# Patient Record
Sex: FEMALE | Race: OTHER | Employment: FULL TIME | URBAN - METROPOLITAN AREA
[De-identification: names, ages, dates, MRNs, and addresses within clinical notes are randomized per-mention and may not be internally consistent; named-entity substitution may affect disease eponyms.]

---

## 2019-09-23 ENCOUNTER — HOSPITAL ENCOUNTER (EMERGENCY)
Facility: HOSPITAL | Age: 39
Discharge: HOME | End: 2019-09-23
Attending: EMERGENCY MEDICINE

## 2019-09-23 VITALS
DIASTOLIC BLOOD PRESSURE: 68 MMHG | WEIGHT: 186.75 LBS | BODY MASS INDEX: 31.11 KG/M2 | RESPIRATION RATE: 18 BRPM | SYSTOLIC BLOOD PRESSURE: 116 MMHG | OXYGEN SATURATION: 100 % | HEIGHT: 65 IN | HEART RATE: 69 BPM | TEMPERATURE: 98 F

## 2019-09-23 DIAGNOSIS — R10.9 ABDOMINAL CRAMPING: Primary | ICD-10-CM

## 2019-09-23 LAB
B-HCG UR QL: NEGATIVE
BACTERIA GENITAL QL WET PREP: ABNORMAL
BILIRUB UR QL STRIP: NEGATIVE
CLARITY UR REFRACT.AUTO: CLEAR
CLUE CELLS VAG QL WET PREP: ABNORMAL
COLOR UR AUTO: NORMAL
CTP QC/QA: YES
FILAMENT FUNGI VAG WET PREP-#/AREA: ABNORMAL
GLUCOSE UR QL STRIP: NEGATIVE
HGB UR QL STRIP: NEGATIVE
KETONES UR QL STRIP: NEGATIVE
LEUKOCYTE ESTERASE UR QL STRIP: NEGATIVE
NITRITE UR QL STRIP: NEGATIVE
PH UR STRIP: 8 [PH] (ref 5–8)
PROT UR QL STRIP: NEGATIVE
SP GR UR STRIP: 1 (ref 1–1.03)
SPECIMEN SOURCE: ABNORMAL
T VAGINALIS GENITAL QL WET PREP: ABNORMAL
URN SPEC COLLECT METH UR: NORMAL
WBC #/AREA VAG WET PREP: ABNORMAL
YEAST GENITAL QL WET PREP: ABNORMAL

## 2019-09-23 PROCEDURE — 81003 URINALYSIS AUTO W/O SCOPE: CPT

## 2019-09-23 PROCEDURE — 87491 CHLMYD TRACH DNA AMP PROBE: CPT

## 2019-09-23 PROCEDURE — 81025 URINE PREGNANCY TEST: CPT | Performed by: PHYSICIAN ASSISTANT

## 2019-09-23 PROCEDURE — 87210 SMEAR WET MOUNT SALINE/INK: CPT

## 2019-09-23 PROCEDURE — 99284 EMERGENCY DEPT VISIT MOD MDM: CPT | Mod: 25

## 2019-09-23 PROCEDURE — 99282 PR EMERGENCY DEPT VISIT,LEVEL II: ICD-10-PCS | Mod: ,,, | Performed by: PHYSICIAN ASSISTANT

## 2019-09-23 PROCEDURE — 99282 EMERGENCY DEPT VISIT SF MDM: CPT | Mod: ,,, | Performed by: PHYSICIAN ASSISTANT

## 2019-09-24 LAB
C TRACH DNA SPEC QL NAA+PROBE: NOT DETECTED
N GONORRHOEA DNA SPEC QL NAA+PROBE: NOT DETECTED

## 2019-09-24 NOTE — ED TRIAGE NOTES
Patricia Talbot, an 38 y.o. female presents to the ED after taking several pregnancy tests at home, most of which were negative.  Patient says one of them was positive and she's passing small stringy pieces of blood, that her abdomen is growing and that she feels some cramping as if she were pregnant.  Patient's fellow church[employer]ers have told her that sometimes pregnancy tests are negative with urine but that they require a blood test.        Review of patient's allergies indicates:  Allergies not on file  Chief Complaint   Patient presents with    Abdominal Pain     positive preg test 1 month ago, now having pain to RUQ and RLQ of abdomen. also reports spotting from 9/16-9/19. denies N/V/D, but endorses fatigue     No past medical history on file.

## 2019-09-24 NOTE — ED PROVIDER NOTES
Encounter Date: 9/23/2019       History     Chief Complaint   Patient presents with    Abdominal Pain     positive preg test 1 month ago, now having pain to RUQ and RLQ of abdomen. also reports spotting from 9/16-9/19. denies N/V/D, but endorses fatigue     38-year-old female with no medical history presents to the ED complaining of lower abdominal pain that started this afternoon.  She describes the pain as intermittent 2-3/10 cramping.  She has not taken any over-the-counter pain medication prior to arrival.  She reports nausea without vomiting and mild headache. She is concerned that she might be pregnant.  LMP 09/16/2019.  She denies fever, chills, dysuria, hematuria, vaginal bleeding, vaginal discharge, flank pain, diarrhea.    The history is provided by the patient. The history is limited by a language barrier ([place]). A  was used.     Review of patient's allergies indicates:  No Known Allergies  No past medical history on file.  No past surgical history on file.  No family history on file.  Social History     Tobacco Use    Smoking status: Not on file   Substance Use Topics    Alcohol use: Not on file    Drug use: Not on file     Review of Systems   Constitutional: Negative for chills and fever.   HENT: Negative for congestion, rhinorrhea and sore throat.    Eyes: Negative for photophobia and visual disturbance.   Respiratory: Negative for cough and shortness of breath.    Cardiovascular: Negative for chest pain.   Gastrointestinal: Positive for abdominal pain and nausea. Negative for constipation, diarrhea and vomiting.   Genitourinary: Negative for dysuria, hematuria, vaginal bleeding and vaginal discharge.   Musculoskeletal: Negative for back pain, neck pain and neck stiffness.   Skin: Negative for rash and wound.   Neurological: Positive for headaches. Negative for light-headedness and numbness.   Psychiatric/Behavioral: Negative for confusion.       Physical Exam     Initial  Vitals [09/23/19 2023]   BP Pulse Resp Temp SpO2   116/68 69 18 98 °F (36.7 °C) 100 %      MAP       --         Physical Exam    Nursing note and vitals reviewed.  Constitutional: She appears well-developed and well-nourished. She is not diaphoretic. No distress.   HENT:   Head: Normocephalic and atraumatic.   Neck: Normal range of motion. Neck supple.   Cardiovascular: Normal rate, regular rhythm and normal heart sounds. Exam reveals no gallop and no friction rub.    No murmur heard.  Pulmonary/Chest: Breath sounds normal. She has no wheezes. She has no rhonchi. She has no rales.   Abdominal: Soft. Bowel sounds are normal. There is no tenderness. There is no rebound and no guarding.   Genitourinary: There is no rash or tenderness on the right labia. There is no rash or tenderness on the left labia. Uterus is not tender. Cervix exhibits no motion tenderness, no discharge and no friability. Right adnexum displays no mass and no tenderness. Left adnexum displays no mass and no tenderness. No tenderness or bleeding in the vagina. No vaginal discharge found.   Genitourinary Comments: Irregular appearance of the inferior cervix - cobblestoning.   Musculoskeletal: Normal range of motion.   Neurological: She is alert and oriented to person, place, and time.   Skin: Skin is warm and dry. No rash noted. No erythema.   Psychiatric: She has a normal mood and affect.         ED Course   Procedures  Labs Reviewed   VAGINAL SCREEN - Abnormal; Notable for the following components:       Result Value    Clue Cells Rare (*)     WBC - Vaginal Screen Rare (*)     Bacteria - Vaginal Screen Occasional (*)     All other components within normal limits   C. TRACHOMATIS/N. GONORRHOEAE BY AMP DNA   URINALYSIS, REFLEX TO URINE CULTURE    Narrative:     Preferred Collection Type->Urine, Clean Catch   POCT URINE PREGNANCY          Imaging Results    None          Medical Decision Making:   History:   Old Medical Records: I decided to obtain  old medical records.  Clinical Tests:   Lab Tests: Ordered and Reviewed       APC / Resident Notes:   38-year-old female with no medical history presents to the ED complaining of lower abdominal pain that started this afternoon.  Vital signs stable. Regular rate and rhythm.  Lungs are clear.  Abdomen is soft and nontender to palpation. No CVA tenderness. Pelvic exam:  No vaginal bleeding or vaginal discharge, abnormal appearing inferior cervix with cobblestoning.  The cervix is not friable or erythematous and there is no cervical motion tenderness. With a benign abdominal exam, I do send not suspect TOA, ovarian torsion, nephrolithiasis.  Differential diagnosis includes but is not limited to pregnancy, UTI, vaginal infection.    UPT negative. UA with no infection. Rare clue cells.    I do not feel that she needs any further labs or imaging at this time. Stable for discharge.     She was discharged without any new prescritpions.  She will follow up with OBGYN - will need pap smear.  All of the patient's questions were answered.  I reviewed the patient's chart and labs and discussed the case with my supervising physician.                    Clinical Impression:       ICD-10-CM ICD-9-CM   1. Abdominal cramping R10.9 789.00         Disposition:   Disposition: Discharged  Condition: Stable                        Felipa Wray PA-C  09/24/19 0039